# Patient Record
Sex: MALE | URBAN - METROPOLITAN AREA
[De-identification: names, ages, dates, MRNs, and addresses within clinical notes are randomized per-mention and may not be internally consistent; named-entity substitution may affect disease eponyms.]

---
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
## 2023-05-22 ENCOUNTER — COMPREHENSIVE EXAM (OUTPATIENT)
Dept: URBAN - METROPOLITAN AREA CLINIC 29 | Facility: CLINIC | Age: 18
End: 2023-05-22

## 2023-05-22 DIAGNOSIS — H52.10: ICD-10-CM

## 2023-05-22 PROCEDURE — 92015 DETERMINE REFRACTIVE STATE: CPT

## 2023-05-22 PROCEDURE — 92310-2 LEVEL 2 CONTACT LENS MANAGEMENT

## 2023-05-22 PROCEDURE — 92014 COMPRE OPH EXAM EST PT 1/>: CPT

## 2023-05-22 ASSESSMENT — VISUAL ACUITY
OD_CC: 20/25
OS_SC: 20/40+2
OD_SC: 20/25+2
OU_SC: 20/20
OS_CC: 20/20

## 2023-05-22 ASSESSMENT — TONOMETRY
OS_IOP_MMHG: 12
OD_IOP_MMHG: 14

## 2024-05-22 ENCOUNTER — COMPREHENSIVE EXAM (OUTPATIENT)
Dept: URBAN - METROPOLITAN AREA CLINIC 29 | Facility: CLINIC | Age: 19
End: 2024-05-22

## 2024-05-22 DIAGNOSIS — H52.223: ICD-10-CM

## 2024-05-22 DIAGNOSIS — H52.13: ICD-10-CM

## 2024-05-22 PROCEDURE — 92015 DETERMINE REFRACTIVE STATE: CPT

## 2024-05-22 PROCEDURE — 92310-2 LEVEL 2 CONTACT LENS MANAGEMENT: Mod: 21

## 2024-05-22 PROCEDURE — 92014 COMPRE OPH EXAM EST PT 1/>: CPT

## 2024-05-22 ASSESSMENT — TONOMETRY
OS_IOP_MMHG: 12
OD_IOP_MMHG: 12

## 2024-05-22 ASSESSMENT — VISUAL ACUITY
OD_SC: 20/20-1
OS_SC: 20/20-1
OS_SC: 20/40
OD_SC: 20/25+1
OS_PH: 20/25-1

## 2025-08-04 ENCOUNTER — COMPREHENSIVE EXAM (OUTPATIENT)
Age: 20
End: 2025-08-04

## 2025-08-04 DIAGNOSIS — H52.223: ICD-10-CM

## 2025-08-04 DIAGNOSIS — H52.13: ICD-10-CM

## 2025-08-04 PROCEDURE — 92015 DETERMINE REFRACTIVE STATE: CPT

## 2025-08-04 PROCEDURE — 92014 COMPRE OPH EXAM EST PT 1/>: CPT

## 2025-08-04 PROCEDURE — 92310-1 LEVEL 1 SOFT LENS UPDATE
